# Patient Record
Sex: MALE | Race: WHITE | URBAN - METROPOLITAN AREA
[De-identification: names, ages, dates, MRNs, and addresses within clinical notes are randomized per-mention and may not be internally consistent; named-entity substitution may affect disease eponyms.]

---

## 2017-04-27 ENCOUNTER — IMPORTED ENCOUNTER (OUTPATIENT)
Dept: URBAN - METROPOLITAN AREA CLINIC 50 | Facility: CLINIC | Age: 72
End: 2017-04-27

## 2017-05-02 ENCOUNTER — IMPORTED ENCOUNTER (OUTPATIENT)
Dept: URBAN - METROPOLITAN AREA CLINIC 50 | Facility: CLINIC | Age: 72
End: 2017-05-02

## 2017-05-08 ENCOUNTER — IMPORTED ENCOUNTER (OUTPATIENT)
Dept: URBAN - METROPOLITAN AREA CLINIC 50 | Facility: CLINIC | Age: 72
End: 2017-05-08

## 2018-10-02 ENCOUNTER — IMPORTED ENCOUNTER (OUTPATIENT)
Dept: URBAN - METROPOLITAN AREA CLINIC 50 | Facility: CLINIC | Age: 73
End: 2018-10-02

## 2018-10-11 ENCOUNTER — IMPORTED ENCOUNTER (OUTPATIENT)
Dept: URBAN - METROPOLITAN AREA CLINIC 50 | Facility: CLINIC | Age: 73
End: 2018-10-11

## 2019-02-19 NOTE — PATIENT DISCUSSION
Discussed with patient that I would like to do some supplemental PRP in the right eye in about one month.  I will plan to do light PRP in the left eye in the future.
Discussed with the patient the importance of good control of their blood sugar, blood pressure, cholesterol, diet, exercise, weight, and medication usage under the guidance of their diabetic doctor to prevent/halt diabetic retinopathy.
Membrane is not visually significant.
No retinal detachment or retinal tear noted.
Recommended observation.
Vitreous Hemorrhage has cleared.
numerical 0-10

## 2019-07-22 NOTE — PATIENT DISCUSSION
I have recommended that Mr. Shahriar Cortes have vitrectomy surgery to remove the hemorrhage from the right eye. All questions and concerns addressed at length and he would like to schedule soon.

## 2019-09-04 NOTE — PATIENT DISCUSSION
"""Continue Artificial tears both eyes two - four times a day ."" ""Continue Gel drops left eye at bedtime ."""

## 2019-10-08 ENCOUNTER — IMPORTED ENCOUNTER (OUTPATIENT)
Dept: URBAN - METROPOLITAN AREA CLINIC 50 | Facility: CLINIC | Age: 74
End: 2019-10-08

## 2019-11-05 ENCOUNTER — IMPORTED ENCOUNTER (OUTPATIENT)
Dept: URBAN - METROPOLITAN AREA CLINIC 50 | Facility: CLINIC | Age: 74
End: 2019-11-05

## 2020-05-26 NOTE — PATIENT DISCUSSION
Mr. Sally Baker diabetic retinopathy is stable and I have asked him to return on an as needed basis.

## 2020-12-22 ENCOUNTER — IMPORTED ENCOUNTER (OUTPATIENT)
Dept: URBAN - METROPOLITAN AREA CLINIC 50 | Facility: CLINIC | Age: 75
End: 2020-12-22

## 2021-04-18 ASSESSMENT — TONOMETRY
OD_IOP_MMHG: 13
OS_IOP_MMHG: 13
OS_IOP_MMHG: 14
OS_IOP_MMHG: 14
OD_IOP_MMHG: 15
OS_IOP_MMHG: 16
OD_IOP_MMHG: 16
OS_IOP_MMHG: 15
OD_IOP_MMHG: 14
OS_IOP_MMHG: 13
OD_IOP_MMHG: 15
OD_IOP_MMHG: 13

## 2021-04-18 ASSESSMENT — VISUAL ACUITY
OS_OTHER: 20/40. 20/80.
OD_BAT: >400
OS_SC: 20/50
OD_CC: 20/25-2
OS_BAT: 20/40
OD_CC: 20/40-1
OD_BAT: 20/100
OD_CC: J2@ 14 IN
OS_OTHER: 20/50. 20/200.
OD_PH: 20/40-1
OD_CC: J1+
OS_PH: 20/30
OD_OTHER: 20/100. >20/400.
OS_CC: 20/25-
OD_OTHER: >400.
OD_OTHER: 20/70. 20/>400.
OD_BAT: 20/70
OS_CC: J1+
OS_CC: J1+
OS_BAT: >400
OS_BAT: 20/40-1
OD_CC: J1@ 15 IN
OS_PH: 20/25
OS_SC: 20/50
OD_SC: 20/60
OS_BAT: 20/50
OS_CC: J1@ 15 IN
OD_OTHER: 20/60. 20/200.
OS_CC: 20/30
OD_CC: J1+
OD_BAT: 20/60
OD_SC: 20/40
OS_OTHER: >400.
OS_CC: J2@ 14 IN

## 2021-04-18 ASSESSMENT — PACHYMETRY
OS_CT_UM: 581
OD_CT_UM: 559

## 2021-11-24 NOTE — PATIENT DISCUSSION
[facility] - STANDARD IOL/+FEMTO: Phacoemulsification with IOL: Eye: OD|DOS: 12/16/2021|Model: DIBOO|Power: 23. 5|Target: PLANO|Femto: YES|Arcs: 35° @ 75° ; 35° @ 255°|Visc: DUET|Omidria: YES|10% Phenylephrine: YES|Epi-shugarcaine: YES|Phaco Setting: STD|Notes: PLAN: Alejandra Fearing @ [facility]ANO OU; HX: FAINT ERM OU; QUESTIONABLE MICRO CYST OS. DILATED PUPIL: 7MM.

## 2021-12-16 NOTE — PATIENT DISCUSSION
[facility] - STANDARD IOL/+FEMTO: Phacoemulsification with IOL: Eye: OD|DOS: 12/16/2021|Model: DIBOO|Power: 23. 5|Target: PLANO|Femto: YES|Arcs: 35° @ 75° ; 35° @ 255°|Visc: DUET|Omidria: YES|10% Phenylephrine: YES|Epi-shugarcaine: YES|Phaco Setting: STD|Notes: PLAN: Esther Cruz @ [facility] OU; HX: FAINT ERM OU; QUESTIONABLE MICRO CYST OS. DILATED PUPIL: 7MM.

## 2021-12-16 NOTE — PATIENT DISCUSSION
[facility] - STANDARD IOL/+FEMTO: Phacoemulsification with IOL: Eye: OD|DOS: 12/16/2021|Model: DIBOO|Power: 23. 5|Target: PLANO|Femto: YES|Arcs: 35° @ 75° ; 35° @ 255°|Visc: DUET|Omidria: YES|10% Phenylephrine: YES|Epi-shugarcaine: YES|Phaco Setting: STD|Notes: PLAN: Jaida Theresa @ [facility] OU; HX: FAINT ERM OU; QUESTIONABLE MICRO CYST OS. DILATED PUPIL: 7MM.

## 2021-12-22 NOTE — PATIENT DISCUSSION
[facility] [employer] - STANDARD IOL/+FEMTO: Phacoemulsification with IOL: Eye: OS|DOS: 12/23/2021|Model: DIBOO|Power: 23. 5|Target: PLANO|Femto: YES|Arcs: 38° @ 95° ; 38° @ 275°|Visc: DUET|Omidria: YES|10% Phenylephrine: YES|Epi-shugarcaine: YES|Phaco Setting: STD|Notes: Plan: Eyhance w/Femto Target PLANO OU. Hx: faint ERM OU; Questionable micro cystic changes OS. DILATES to 7MM.

## 2021-12-23 NOTE — PROCEDURE NOTE: SURGICAL
"<span style=""color: [id number]; font-family: polina [facility]s

## 2022-01-12 ENCOUNTER — PREPPED CHART (OUTPATIENT)
Dept: URBAN - METROPOLITAN AREA CLINIC 50 | Facility: CLINIC | Age: 77
End: 2022-01-12

## 2022-01-18 ENCOUNTER — CONSULTATION/EVALUATION (OUTPATIENT)
Dept: URBAN - METROPOLITAN AREA CLINIC 50 | Facility: CLINIC | Age: 77
End: 2022-01-18

## 2022-01-18 DIAGNOSIS — H43.813: ICD-10-CM

## 2022-01-18 DIAGNOSIS — H25.13: ICD-10-CM

## 2022-01-18 DIAGNOSIS — H40.013: ICD-10-CM

## 2022-01-18 DIAGNOSIS — H04.123: ICD-10-CM

## 2022-01-18 PROCEDURE — 92015 DETERMINE REFRACTIVE STATE: CPT

## 2022-01-18 PROCEDURE — 92014 COMPRE OPH EXAM EST PT 1/>: CPT

## 2022-01-18 ASSESSMENT — VISUAL ACUITY
OD_SC: 20/60
OU_CC: J1+
OS_SC: 20/60
OS_PH: 20/30-1
OS_CC: 20/40
OD_GLARE: 20/400
OD_CC: 20/40
OD_GLARE: 20/50

## 2022-01-18 ASSESSMENT — TONOMETRY
OD_IOP_MMHG: 12
OS_IOP_MMHG: 13
OD_IOP_MMHG: 13
OS_IOP_MMHG: 11

## 2022-01-26 NOTE — PATIENT DISCUSSION
Amended chart 2/17/22 ROSEMARIE: Patient complaining of excessive tearing and watering and is using AFT's Q2hrs. Will refer patient out to Dr. Nathan Gupta office for possible surgical intervention.

## 2022-01-26 NOTE — PATIENT DISCUSSION
Recommended patient to do some sinus equalizing techniques along with Flonase BID x 2 weeks. If patient is still symptomatic in 2 weeks will refer to Dr. Carlene Tolbert.

## 2022-04-11 ENCOUNTER — DIAGNOSTICS ONLY (OUTPATIENT)
Dept: URBAN - METROPOLITAN AREA CLINIC 50 | Facility: CLINIC | Age: 77
End: 2022-04-11

## 2022-04-11 DIAGNOSIS — H40.013: ICD-10-CM

## 2022-04-11 PROCEDURE — 92133 CPTRZD OPH DX IMG PST SGM ON: CPT

## 2022-04-11 PROCEDURE — 92083 EXTENDED VISUAL FIELD XM: CPT

## 2022-04-12 NOTE — PATIENT DISCUSSION
Recommended patient to do some sinus equalizing techniques along with Flonase BID x 2 weeks. If patient is still symptomatic in 2 weeks will refer to Dr. Brett Schmidt.

## 2022-12-15 NOTE — PATIENT DISCUSSION
Recommended patient to do some sinus equalizing techniques along with Flonase BID x 2 weeks. If patient is still symptomatic in 2 weeks will refer to Dr. Giorgi Diamond.

## 2023-04-12 ENCOUNTER — POST-OP (OUTPATIENT)
Dept: URBAN - METROPOLITAN AREA CLINIC 50 | Facility: CLINIC | Age: 78
End: 2023-04-12

## 2023-04-12 ENCOUNTER — SURGERY/PROCEDURE (OUTPATIENT)
Dept: URBAN - METROPOLITAN AREA SURGERY 16 | Facility: SURGERY | Age: 78
End: 2023-04-12

## 2023-04-12 DIAGNOSIS — Z96.1: ICD-10-CM

## 2023-04-12 DIAGNOSIS — H25.12: ICD-10-CM

## 2023-04-12 DIAGNOSIS — Z98.42: ICD-10-CM

## 2023-04-12 PROCEDURE — 99199PCV CUSTOM VISION

## 2023-04-12 PROCEDURE — 66984CV REMOVE CATARACT, INSERT LENS, CUSTOM VISION

## 2023-04-12 ASSESSMENT — TONOMETRY
OS_IOP_MMHG: 14
OS_IOP_MMHG: 33

## 2023-04-12 ASSESSMENT — VISUAL ACUITY: OS_SC: 20/50

## 2023-04-12 ASSESSMENT — KERATOMETRY
OS_AXISANGLE2_DEGREES: 95
OS_K2POWER_DIOPTERS: 41.87
OD_AXISANGLE2_DEGREES: 87
OD_AXISANGLE_DEGREES: 177
OS_AXISANGLE_DEGREES: 05
OD_K1POWER_DIOPTERS: 42.87
OD_K1POWER_DIOPTERS: 42.87
OD_K2POWER_DIOPTERS: 41.62
OD_AXISANGLE2_DEGREES: 87
OS_K1POWER_DIOPTERS: 42.62
OS_AXISANGLE2_DEGREES: 95
OD_AXISANGLE_DEGREES: 177
OS_K2POWER_DIOPTERS: 41.87
OD_K2POWER_DIOPTERS: 41.62
OS_K1POWER_DIOPTERS: 42.62
OS_AXISANGLE_DEGREES: 05

## 2023-04-18 ENCOUNTER — POST OP/EVAL OF SECOND EYE (OUTPATIENT)
Dept: URBAN - METROPOLITAN AREA CLINIC 50 | Facility: CLINIC | Age: 78
End: 2023-04-18

## 2023-04-18 DIAGNOSIS — Z98.42: ICD-10-CM

## 2023-04-18 DIAGNOSIS — H25.11: ICD-10-CM

## 2023-04-18 DIAGNOSIS — Z96.1: ICD-10-CM

## 2023-04-18 PROCEDURE — 92136 - 2N OPHTHALMIC BIOMETRY BY PARTIAL COHERENCE INTERFEROMETRY WITH INTRAOCULAR LENS POWER CALCULATION

## 2023-04-18 PROCEDURE — 99213 OFFICE O/P EST LOW 20 MIN: CPT

## 2023-04-18 ASSESSMENT — TONOMETRY
OS_IOP_MMHG: 17
OD_IOP_MMHG: 14
OD_IOP_MMHG: 13
OS_IOP_MMHG: 15

## 2023-04-18 ASSESSMENT — VISUAL ACUITY
OU_SC: 20/25
OD_SC: 20/70
OS_SC: 20/25 @ 21"
OS_SC: 20/25

## 2023-04-26 ENCOUNTER — POST-OP (OUTPATIENT)
Dept: URBAN - METROPOLITAN AREA CLINIC 50 | Facility: CLINIC | Age: 78
End: 2023-04-26

## 2023-04-26 ENCOUNTER — SURGERY/PROCEDURE (OUTPATIENT)
Dept: URBAN - METROPOLITAN AREA SURGERY 16 | Facility: SURGERY | Age: 78
End: 2023-04-26

## 2023-04-26 DIAGNOSIS — Z98.41: ICD-10-CM

## 2023-04-26 DIAGNOSIS — H25.11: ICD-10-CM

## 2023-04-26 DIAGNOSIS — Z96.1: ICD-10-CM

## 2023-04-26 PROCEDURE — 99199PCV CUSTOM VISION

## 2023-04-26 PROCEDURE — 66984CV REMOVE CATARACT, INSERT LENS, CUSTOM VISION

## 2023-04-26 PROCEDURE — 99024 POSTOP FOLLOW-UP VISIT: CPT

## 2023-04-26 ASSESSMENT — TONOMETRY
OD_IOP_MMHG: 20
OD_IOP_MMHG: 19

## 2023-04-26 ASSESSMENT — VISUAL ACUITY
OD_PH: 20/30-1
OD_SC: 20/60

## 2023-05-02 ENCOUNTER — POST-OP (OUTPATIENT)
Dept: URBAN - METROPOLITAN AREA CLINIC 50 | Facility: CLINIC | Age: 78
End: 2023-05-02

## 2023-05-02 DIAGNOSIS — Z98.41: ICD-10-CM

## 2023-05-02 PROCEDURE — 99024 POSTOP FOLLOW-UP VISIT: CPT

## 2023-05-02 ASSESSMENT — VISUAL ACUITY
OD_PH: 20/20
OS_SC: 20/20
OD_SC: J10@14"
OU_SC: 20/40@22"
OU_SC: 20/25
OD_SC: 20/100@22"
OS_SC: J4@14"
OD_SC: 20/40
OU_SC: J4@14"
OS_SC: 20/50@22"

## 2023-05-02 ASSESSMENT — TONOMETRY
OS_IOP_MMHG: 12
OD_IOP_MMHG: 14
OS_IOP_MMHG: 14
OD_IOP_MMHG: 15

## 2023-05-30 ENCOUNTER — POST-OP (OUTPATIENT)
Dept: URBAN - METROPOLITAN AREA CLINIC 50 | Facility: CLINIC | Age: 78
End: 2023-05-30

## 2023-05-30 DIAGNOSIS — Z96.1: ICD-10-CM

## 2023-05-30 DIAGNOSIS — Z98.42: ICD-10-CM

## 2023-05-30 DIAGNOSIS — Z98.41: ICD-10-CM

## 2023-05-30 PROCEDURE — 92015GRNC REFRACTION GLASSES RECHECK NO CHARGE

## 2023-05-30 PROCEDURE — 99024 POSTOP FOLLOW-UP VISIT: CPT

## 2023-05-30 ASSESSMENT — VISUAL ACUITY
OU_SC: 20/25-1
OD_SC: J5@16"
OS_SC: 20/20-1
OU_SC: 20/100@22"
OU_SC: J3@16"
OD_SC: 20/30-1
OS_SC: J10@16"

## 2023-05-30 ASSESSMENT — TONOMETRY
OD_IOP_MMHG: 12
OD_IOP_MMHG: 11
OS_IOP_MMHG: 14
OS_IOP_MMHG: 12

## 2023-05-30 ASSESSMENT — KERATOMETRY
OD_K1POWER_DIOPTERS: 42.00
OS_K2POWER_DIOPTERS: 43.25
OD_AXISANGLE2_DEGREES: 166
OS_AXISANGLE_DEGREES: 95
OD_K2POWER_DIOPTERS: 43.25
OD_AXISANGLE_DEGREES: 76
OS_AXISANGLE2_DEGREES: 5
OS_K1POWER_DIOPTERS: 42.00

## 2024-06-20 ENCOUNTER — COMPREHENSIVE EXAM (OUTPATIENT)
Dept: URBAN - METROPOLITAN AREA CLINIC 50 | Facility: LOCATION | Age: 79
End: 2024-06-20

## 2024-06-20 DIAGNOSIS — H02.831: ICD-10-CM

## 2024-06-20 DIAGNOSIS — H02.834: ICD-10-CM

## 2024-06-20 DIAGNOSIS — H26.493: ICD-10-CM

## 2024-06-20 PROCEDURE — 99214 OFFICE O/P EST MOD 30 MIN: CPT

## 2024-06-20 PROCEDURE — 92015 DETERMINE REFRACTIVE STATE: CPT

## 2024-06-20 ASSESSMENT — KERATOMETRY
OD_AXISANGLE_DEGREES: 070
OD_AXISANGLE2_DEGREES: 160
OD_K1POWER_DIOPTERS: 42.00
OS_K1POWER_DIOPTERS: 42.75
OS_AXISANGLE2_DEGREES: 90
OS_K2POWER_DIOPTERS: 42.75
OS_AXISANGLE_DEGREES: 180
OD_K2POWER_DIOPTERS: 43.00

## 2024-06-20 ASSESSMENT — VISUAL ACUITY
OU_SC: J2@16"
OD_GLARE: 20/20
OS_GLARE: 20/20
OS_GLARE: 20/40
OD_SC: 20/30
OD_GLARE: 20/20
OS_SC: 20/20
OD_PH: 20/25-1
OU_SC: 20/20

## 2024-06-20 ASSESSMENT — TONOMETRY
OS_IOP_MMHG: 12
OD_IOP_MMHG: 11
OS_IOP_MMHG: 10
OD_IOP_MMHG: 12

## 2024-06-25 ENCOUNTER — CONSULTATION/EVALUATION (OUTPATIENT)
Dept: URBAN - METROPOLITAN AREA CLINIC 50 | Facility: LOCATION | Age: 79
End: 2024-06-25

## 2024-06-25 DIAGNOSIS — H26.493: ICD-10-CM

## 2024-06-25 PROCEDURE — 99214 OFFICE O/P EST MOD 30 MIN: CPT

## 2024-06-25 PROCEDURE — 66821 AFTER CATARACT LASER SURGERY: CPT | Mod: 79,LT

## 2024-06-25 RX ORDER — PREDNISOLONE ACETATE 10 MG/ML: 1 SUSPENSION/ DROPS OPHTHALMIC TWICE A DAY

## 2024-06-25 ASSESSMENT — VISUAL ACUITY
OU_SC: 20/20-2
OS_SC: 20/25
OD_SC: 20/30

## 2024-06-25 ASSESSMENT — TONOMETRY
OS_IOP_MMHG: 12
OS_IOP_MMHG: 10
OD_IOP_MMHG: 11
OD_IOP_MMHG: 12

## 2024-07-30 ENCOUNTER — FOLLOW UP (OUTPATIENT)
Dept: URBAN - METROPOLITAN AREA CLINIC 50 | Facility: LOCATION | Age: 79
End: 2024-07-30

## 2024-07-30 DIAGNOSIS — H26.491: ICD-10-CM

## 2024-07-30 DIAGNOSIS — Z98.890: ICD-10-CM

## 2024-07-30 PROCEDURE — 99214 OFFICE O/P EST MOD 30 MIN: CPT

## 2024-07-30 PROCEDURE — 92015 DETERMINE REFRACTIVE STATE: CPT

## 2024-07-30 ASSESSMENT — KERATOMETRY
OD_AXISANGLE_DEGREES: 72
OS_AXISANGLE_DEGREES: 94
OS_K2POWER_DIOPTERS: 43.00
OS_K1POWER_DIOPTERS: 42.50
OD_AXISANGLE2_DEGREES: 162
OD_K1POWER_DIOPTERS: 42.25
OS_AXISANGLE2_DEGREES: 4
OD_K2POWER_DIOPTERS: 43.25

## 2024-07-30 ASSESSMENT — VISUAL ACUITY
OS_SC: 20/25-1
OD_SC: 20/30-1

## 2024-07-30 ASSESSMENT — TONOMETRY
OS_IOP_MMHG: 12
OD_IOP_MMHG: 14
OD_IOP_MMHG: 13
OS_IOP_MMHG: 14

## 2024-12-13 ENCOUNTER — DIAGNOSTICS ONLY (OUTPATIENT)
Age: 79
End: 2024-12-13

## 2024-12-13 DIAGNOSIS — H40.013: ICD-10-CM

## 2024-12-13 PROCEDURE — 92133 CPTRZD OPH DX IMG PST SGM ON: CPT

## 2024-12-13 PROCEDURE — 92083 EXTENDED VISUAL FIELD XM: CPT

## 2024-12-17 ENCOUNTER — FOLLOW UP (OUTPATIENT)
Age: 79
End: 2024-12-17

## 2024-12-17 DIAGNOSIS — H40.013: ICD-10-CM

## 2024-12-17 PROCEDURE — 99213 OFFICE O/P EST LOW 20 MIN: CPT

## 2025-05-20 ENCOUNTER — COMPREHENSIVE EXAM (OUTPATIENT)
Age: 80
End: 2025-05-20

## 2025-05-20 DIAGNOSIS — H43.813: ICD-10-CM

## 2025-05-20 DIAGNOSIS — H02.831: ICD-10-CM

## 2025-05-20 DIAGNOSIS — H02.055: ICD-10-CM

## 2025-05-20 DIAGNOSIS — H04.123: ICD-10-CM

## 2025-05-20 DIAGNOSIS — H02.89: ICD-10-CM

## 2025-05-20 DIAGNOSIS — D31.31: ICD-10-CM

## 2025-05-20 DIAGNOSIS — H40.013: ICD-10-CM

## 2025-05-20 DIAGNOSIS — H02.834: ICD-10-CM

## 2025-05-20 DIAGNOSIS — H26.491: ICD-10-CM

## 2025-05-20 PROCEDURE — 99214 OFFICE O/P EST MOD 30 MIN: CPT
